# Patient Record
Sex: FEMALE | Race: WHITE | NOT HISPANIC OR LATINO
[De-identification: names, ages, dates, MRNs, and addresses within clinical notes are randomized per-mention and may not be internally consistent; named-entity substitution may affect disease eponyms.]

---

## 2017-11-08 ENCOUNTER — NON-APPOINTMENT (OUTPATIENT)
Age: 42
End: 2017-11-08

## 2017-11-08 ENCOUNTER — APPOINTMENT (OUTPATIENT)
Dept: FAMILY MEDICINE | Facility: CLINIC | Age: 42
End: 2017-11-08
Payer: MEDICAID

## 2017-11-08 VITALS
HEART RATE: 100 BPM | WEIGHT: 129 LBS | DIASTOLIC BLOOD PRESSURE: 80 MMHG | SYSTOLIC BLOOD PRESSURE: 122 MMHG | OXYGEN SATURATION: 97 % | HEIGHT: 69 IN | BODY MASS INDEX: 19.11 KG/M2

## 2017-11-08 DIAGNOSIS — Z13.220 ENCOUNTER FOR SCREENING FOR LIPOID DISORDERS: ICD-10-CM

## 2017-11-08 DIAGNOSIS — Z85.3 PERSONAL HISTORY OF MALIGNANT NEOPLASM OF BREAST: ICD-10-CM

## 2017-11-08 DIAGNOSIS — Z13.0 ENCOUNTER FOR SCREENING FOR DISEASES OF THE BLOOD AND BLOOD-FORMING ORGANS AND CERTAIN DISORDERS INVOLVING THE IMMUNE MECHANISM: ICD-10-CM

## 2017-11-08 DIAGNOSIS — Z23 ENCOUNTER FOR IMMUNIZATION: ICD-10-CM

## 2017-11-08 DIAGNOSIS — Z13.0 ENCOUNTER FOR SCREENING FOR OTHER SUSPECTED ENDOCRINE DISORDER: ICD-10-CM

## 2017-11-08 DIAGNOSIS — Z13.228 ENCOUNTER FOR SCREENING FOR OTHER SUSPECTED ENDOCRINE DISORDER: ICD-10-CM

## 2017-11-08 DIAGNOSIS — Z13.29 ENCOUNTER FOR SCREENING FOR OTHER SUSPECTED ENDOCRINE DISORDER: ICD-10-CM

## 2017-11-08 DIAGNOSIS — F17.200 NICOTINE DEPENDENCE, UNSPECIFIED, UNCOMPLICATED: ICD-10-CM

## 2017-11-08 DIAGNOSIS — F41.9 ANXIETY DISORDER, UNSPECIFIED: ICD-10-CM

## 2017-11-08 DIAGNOSIS — Z87.898 PERSONAL HISTORY OF OTHER SPECIFIED CONDITIONS: ICD-10-CM

## 2017-11-08 DIAGNOSIS — Z85.42 PERSONAL HISTORY OF MALIGNANT NEOPLASM OF OTHER PARTS OF UTERUS: ICD-10-CM

## 2017-11-08 PROCEDURE — G0008: CPT

## 2017-11-08 PROCEDURE — 93000 ELECTROCARDIOGRAM COMPLETE: CPT

## 2017-11-08 PROCEDURE — 99204 OFFICE O/P NEW MOD 45 MIN: CPT | Mod: 25

## 2017-11-08 PROCEDURE — 90688 IIV4 VACCINE SPLT 0.5 ML IM: CPT

## 2017-11-08 PROCEDURE — 99406 BEHAV CHNG SMOKING 3-10 MIN: CPT | Mod: 25

## 2017-11-08 RX ORDER — GABAPENTIN 300 MG
300 TABLET ORAL
Refills: 0 | Status: DISCONTINUED | COMMUNITY
End: 2017-11-08

## 2017-11-08 RX ORDER — GABAPENTIN 600 MG/1
600 TABLET, COATED ORAL 4 TIMES DAILY
Refills: 0 | Status: DISCONTINUED | COMMUNITY
End: 2017-11-08

## 2017-11-08 RX ORDER — ALPRAZOLAM 0.25 MG/1
0.25 TABLET ORAL 4 TIMES DAILY
Refills: 0 | Status: DISCONTINUED | COMMUNITY
End: 2017-11-08

## 2017-11-14 ENCOUNTER — APPOINTMENT (OUTPATIENT)
Dept: MRI IMAGING | Facility: CLINIC | Age: 42
End: 2017-11-14

## 2017-11-28 ENCOUNTER — APPOINTMENT (OUTPATIENT)
Dept: FAMILY MEDICINE | Facility: CLINIC | Age: 42
End: 2017-11-28

## 2017-12-05 ENCOUNTER — APPOINTMENT (OUTPATIENT)
Dept: FAMILY MEDICINE | Facility: CLINIC | Age: 42
End: 2017-12-05

## 2017-12-08 ENCOUNTER — RX RENEWAL (OUTPATIENT)
Age: 42
End: 2017-12-08

## 2018-01-16 ENCOUNTER — APPOINTMENT (OUTPATIENT)
Dept: FAMILY MEDICINE | Facility: CLINIC | Age: 43
End: 2018-01-16

## 2018-03-30 ENCOUNTER — RX RENEWAL (OUTPATIENT)
Age: 43
End: 2018-03-30

## 2018-04-02 ENCOUNTER — APPOINTMENT (OUTPATIENT)
Dept: FAMILY MEDICINE | Facility: CLINIC | Age: 43
End: 2018-04-02

## 2018-04-30 ENCOUNTER — RX RENEWAL (OUTPATIENT)
Age: 43
End: 2018-04-30

## 2018-09-03 PROBLEM — Z85.42 HISTORY OF CANCER OF UTERUS: Status: RESOLVED | Noted: 2017-11-08 | Resolved: 2018-09-03

## 2018-09-06 ENCOUNTER — RX RENEWAL (OUTPATIENT)
Age: 43
End: 2018-09-06

## 2018-09-21 ENCOUNTER — RX RENEWAL (OUTPATIENT)
Age: 43
End: 2018-09-21

## 2018-09-24 ENCOUNTER — APPOINTMENT (OUTPATIENT)
Dept: FAMILY MEDICINE | Facility: CLINIC | Age: 43
End: 2018-09-24
Payer: COMMERCIAL

## 2018-09-24 VITALS
HEART RATE: 112 BPM | BODY MASS INDEX: 17.77 KG/M2 | TEMPERATURE: 98.4 F | HEIGHT: 69 IN | SYSTOLIC BLOOD PRESSURE: 98 MMHG | DIASTOLIC BLOOD PRESSURE: 58 MMHG | WEIGHT: 120 LBS | OXYGEN SATURATION: 98 %

## 2018-09-24 DIAGNOSIS — L02.512 CUTANEOUS ABSCESS OF LEFT HAND: ICD-10-CM

## 2018-09-24 DIAGNOSIS — M54.5 LOW BACK PAIN: ICD-10-CM

## 2018-09-24 PROCEDURE — 99203 OFFICE O/P NEW LOW 30 MIN: CPT

## 2018-09-25 PROBLEM — L02.512 ABSCESS OF HAND, LEFT: Status: RESOLVED | Noted: 2017-11-08 | Resolved: 2018-09-25

## 2018-09-25 NOTE — REVIEW OF SYSTEMS
[Back Pain] : back pain [Headache] : headache [Anxiety] : anxiety [Negative] : Heme/Lymph [FreeTextEntry9] : as per HPI [de-identified] : as per HPI

## 2018-09-25 NOTE — ASSESSMENT
[FreeTextEntry1] : Left foot fracture/Right wrist fracture/Multiple rib fractures/Acute low back pain:\par c/w Gabapentin for pain control\par advised to f/u with orthopedics\par \par Subarachnoid hemorrhage:\par see neurology\par \par sign release to obtain hospital records

## 2018-09-25 NOTE — PHYSICAL EXAM
[No Acute Distress] : no acute distress [Well Nourished] : well nourished [Normal Sclera/Conjunctiva] : normal sclera/conjunctiva [PERRL] : pupils equal round and reactive to light [EOMI] : extraocular movements intact [Normal Oropharynx] : the oropharynx was normal [Normal TMs] : both tympanic membranes were normal [Normal Appearance] : was normal in appearance [Neck Supple] : was supple [No Respiratory Distress] : no respiratory distress  [Clear to Auscultation] : lungs were clear to auscultation bilaterally [Normal Rate] : normal rate  [Regular Rhythm] : with a regular rhythm [Normal S1, S2] : normal S1 and S2 [Soft] : abdomen soft [Non Tender] : non-tender [Normal Posterior Cervical Nodes] : no posterior cervical lymphadenopathy [Normal Anterior Cervical Nodes] : no anterior cervical lymphadenopathy [No Focal Deficits] : no focal deficits [Alert and Oriented x3] : oriented to person, place, and time [Well Developed] : well developed [No Rash] : no rash [de-identified] : tenderness mid to low back [de-identified] : cast right forearm, cast left lower leg/foot; tenderness right anterior ribs [de-identified] : Ambulating with crutches
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
## 2018-10-18 ENCOUNTER — RX RENEWAL (OUTPATIENT)
Age: 43
End: 2018-10-18

## 2018-11-18 ENCOUNTER — RX RENEWAL (OUTPATIENT)
Age: 43
End: 2018-11-18

## 2019-02-25 ENCOUNTER — APPOINTMENT (OUTPATIENT)
Dept: ORTHOPEDIC SURGERY | Facility: CLINIC | Age: 44
End: 2019-02-25
Payer: COMMERCIAL

## 2019-02-25 VITALS — BODY MASS INDEX: 16.46 KG/M2 | WEIGHT: 115 LBS | HEIGHT: 70 IN

## 2019-02-25 DIAGNOSIS — S52.509P: ICD-10-CM

## 2019-02-25 PROCEDURE — 99202 OFFICE O/P NEW SF 15 MIN: CPT

## 2019-02-26 PROBLEM — S52.509P: Status: ACTIVE | Noted: 2019-02-26

## 2019-02-26 NOTE — ADDENDUM
[FreeTextEntry1] : I, Jemima Becerra wrote this note acting as a scribe for Dr. Kole Hernandez on Feb 25, 2019.

## 2019-02-26 NOTE — PHYSICAL EXAM
[de-identified] : Patient is WDWN, alert, and in no acute distress. Breathing is unlabored. She is grossly oriented to person, place, and time. \par \par Right Wrist: Radial deformity is present. There is tenderness and swelling.  The ROM is limited. There is no joint instability on provocative testing. Decreased sensation in the long and ring fingers. \par Strength: extension, flexion, ulnar deviation and radial deviation 5/5.  [de-identified] : Xrays of the right wrist obtained from an outside medical facility revealed malunion of the distal radius.
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
## 2019-02-26 NOTE — DISCUSSION/SUMMARY
[de-identified] : The underlying pathophysiology was reviewed with the patient. Treatment options were discussed including surgical intervention. The patient wishes to proceed with corrective osteotomy at this time. The risks and benefits were reviewed with the patient. All of her questions were answered. She will meet with our surgical scheduler.

## 2019-03-01 ENCOUNTER — OUTPATIENT (OUTPATIENT)
Dept: OUTPATIENT SERVICES | Facility: HOSPITAL | Age: 44
LOS: 1 days | End: 2019-03-01
Payer: MEDICAID

## 2019-03-01 PROCEDURE — G9001: CPT

## 2019-03-08 DIAGNOSIS — Z71.89 OTHER SPECIFIED COUNSELING: ICD-10-CM

## 2019-03-11 ENCOUNTER — APPOINTMENT (OUTPATIENT)
Dept: FAMILY MEDICINE | Facility: CLINIC | Age: 44
End: 2019-03-11

## 2019-03-11 ENCOUNTER — OUTPATIENT (OUTPATIENT)
Dept: OUTPATIENT SERVICES | Facility: HOSPITAL | Age: 44
LOS: 1 days | End: 2019-03-11
Payer: COMMERCIAL

## 2019-03-11 VITALS
TEMPERATURE: 98 F | DIASTOLIC BLOOD PRESSURE: 58 MMHG | SYSTOLIC BLOOD PRESSURE: 99 MMHG | HEART RATE: 92 BPM | WEIGHT: 121.92 LBS | OXYGEN SATURATION: 99 % | HEIGHT: 68 IN | RESPIRATION RATE: 14 BRPM

## 2019-03-11 DIAGNOSIS — Z98.890 OTHER SPECIFIED POSTPROCEDURAL STATES: Chronic | ICD-10-CM

## 2019-03-11 DIAGNOSIS — Z90.13 ACQUIRED ABSENCE OF BILATERAL BREASTS AND NIPPLES: Chronic | ICD-10-CM

## 2019-03-11 DIAGNOSIS — S52.90XA UNSPECIFIED FRACTURE OF UNSPECIFIED FOREARM, INITIAL ENCOUNTER FOR CLOSED FRACTURE: ICD-10-CM

## 2019-03-11 DIAGNOSIS — Z01.818 ENCOUNTER FOR OTHER PREPROCEDURAL EXAMINATION: ICD-10-CM

## 2019-03-11 DIAGNOSIS — Z96.7 PRESENCE OF OTHER BONE AND TENDON IMPLANTS: Chronic | ICD-10-CM

## 2019-03-11 DIAGNOSIS — G56.01 CARPAL TUNNEL SYNDROME, RIGHT UPPER LIMB: ICD-10-CM

## 2019-03-11 DIAGNOSIS — Z90.710 ACQUIRED ABSENCE OF BOTH CERVIX AND UTERUS: Chronic | ICD-10-CM

## 2019-03-11 DIAGNOSIS — S62.101A FRACTURE OF UNSPECIFIED CARPAL BONE, RIGHT WRIST, INITIAL ENCOUNTER FOR CLOSED FRACTURE: ICD-10-CM

## 2019-03-11 LAB
HCT VFR BLD CALC: 42.3 % — SIGNIFICANT CHANGE UP (ref 34.5–45)
HGB BLD-MCNC: 14.2 G/DL — SIGNIFICANT CHANGE UP (ref 11.5–15.5)
MCHC RBC-ENTMCNC: 30.8 PG — SIGNIFICANT CHANGE UP (ref 27–34)
MCHC RBC-ENTMCNC: 33.6 GM/DL — SIGNIFICANT CHANGE UP (ref 32–36)
MCV RBC AUTO: 91.8 FL — SIGNIFICANT CHANGE UP (ref 80–100)
NRBC # BLD: 0 /100 WBCS — SIGNIFICANT CHANGE UP (ref 0–0)
PLATELET # BLD AUTO: 150 K/UL — SIGNIFICANT CHANGE UP (ref 150–400)
RBC # BLD: 4.61 M/UL — SIGNIFICANT CHANGE UP (ref 3.8–5.2)
RBC # FLD: 14.2 % — SIGNIFICANT CHANGE UP (ref 10.3–14.5)
WBC # BLD: 5.26 K/UL — SIGNIFICANT CHANGE UP (ref 3.8–10.5)
WBC # FLD AUTO: 5.26 K/UL — SIGNIFICANT CHANGE UP (ref 3.8–10.5)

## 2019-03-11 PROCEDURE — G0463: CPT

## 2019-03-11 PROCEDURE — 85027 COMPLETE CBC AUTOMATED: CPT

## 2019-03-11 PROCEDURE — 36415 COLL VENOUS BLD VENIPUNCTURE: CPT

## 2019-03-11 NOTE — H&P PST ADULT - MUSCULOSKELETAL
details… detailed exam no calf tenderness/joint swelling/diminished strength/decreased ROM due to pain

## 2019-03-11 NOTE — H&P PST ADULT - NSANTHOSAYNRD_GEN_A_CORE
No. PAULIE screening performed.  STOP BANG Legend: 0-2 = LOW Risk; 3-4 = INTERMEDIATE Risk; 5-8 = HIGH Risk

## 2019-03-11 NOTE — H&P PST ADULT - PROBLEM SELECTOR PLAN 1
scheduled for corrective osteotomy right distal radius with bone graft and bone marrow aspiration and right carpal tunnel release on 3/22/18  pre op instruction   Smoking cessation ; not to smoke on  DOS

## 2019-03-11 NOTE — H&P PST ADULT - MUSCULOSKELETAL COMMENTS
Right wrist pain , swelling , carpal tunnel s/p injury Right wrist tender on palpation to dorsal aspect , + mild edema . reports numbness

## 2019-03-11 NOTE — H&P PST ADULT - PSH
S/P bilateral mastectomy  2013  S/P breast reconstruction, bilateral  2013 Gummy bear breast implant  S/P hysterectomy  2012  S/P ORIF (open reduction internal fixation) fracture  left foot

## 2019-03-11 NOTE — H&P PST ADULT - PMH
Cervical cancer  s/p hysterectomy and radiation 2012  Current smoker    Migraine Cervical cancer  s/p hysterectomy and radiation 2012  Current smoker    Migraine    Radius fracture
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
## 2019-03-21 ENCOUNTER — TRANSCRIPTION ENCOUNTER (OUTPATIENT)
Age: 44
End: 2019-03-21

## 2019-03-21 VITALS
RESPIRATION RATE: 15 BRPM | HEIGHT: 69 IN | SYSTOLIC BLOOD PRESSURE: 127 MMHG | DIASTOLIC BLOOD PRESSURE: 94 MMHG | OXYGEN SATURATION: 100 % | WEIGHT: 123.02 LBS | HEART RATE: 75 BPM | TEMPERATURE: 98 F

## 2019-03-22 ENCOUNTER — OUTPATIENT (OUTPATIENT)
Dept: OUTPATIENT SERVICES | Facility: HOSPITAL | Age: 44
LOS: 1 days | End: 2019-03-22
Payer: COMMERCIAL

## 2019-03-22 ENCOUNTER — APPOINTMENT (OUTPATIENT)
Dept: ORTHOPEDIC SURGERY | Facility: HOSPITAL | Age: 44
End: 2019-03-22

## 2019-03-22 VITALS
RESPIRATION RATE: 16 BRPM | SYSTOLIC BLOOD PRESSURE: 109 MMHG | DIASTOLIC BLOOD PRESSURE: 75 MMHG | OXYGEN SATURATION: 96 % | HEART RATE: 74 BPM

## 2019-03-22 DIAGNOSIS — S62.101A FRACTURE OF UNSPECIFIED CARPAL BONE, RIGHT WRIST, INITIAL ENCOUNTER FOR CLOSED FRACTURE: ICD-10-CM

## 2019-03-22 DIAGNOSIS — Z90.13 ACQUIRED ABSENCE OF BILATERAL BREASTS AND NIPPLES: Chronic | ICD-10-CM

## 2019-03-22 DIAGNOSIS — Z98.890 OTHER SPECIFIED POSTPROCEDURAL STATES: Chronic | ICD-10-CM

## 2019-03-22 DIAGNOSIS — Z96.7 PRESENCE OF OTHER BONE AND TENDON IMPLANTS: Chronic | ICD-10-CM

## 2019-03-22 DIAGNOSIS — G56.01 CARPAL TUNNEL SYNDROME, RIGHT UPPER LIMB: ICD-10-CM

## 2019-03-22 DIAGNOSIS — Z90.710 ACQUIRED ABSENCE OF BOTH CERVIX AND UTERUS: Chronic | ICD-10-CM

## 2019-03-22 DIAGNOSIS — Z01.818 ENCOUNTER FOR OTHER PREPROCEDURAL EXAMINATION: ICD-10-CM

## 2019-03-22 PROCEDURE — 25405 REPAIR/GRAFT RADIUS OR ULNA: CPT | Mod: RT

## 2019-03-22 PROCEDURE — C1713: CPT

## 2019-03-22 PROCEDURE — 76000 FLUOROSCOPY <1 HR PHYS/QHP: CPT

## 2019-03-22 PROCEDURE — 38220 DX BONE MARROW ASPIRATIONS: CPT

## 2019-03-22 PROCEDURE — 64721 CARPAL TUNNEL SURGERY: CPT | Mod: RT

## 2019-03-22 PROCEDURE — 25400 REPAIR RADIUS OR ULNA: CPT | Mod: RT

## 2019-03-22 PROCEDURE — C1889: CPT

## 2019-03-22 PROCEDURE — 64721 CARPAL TUNNEL SURGERY: CPT | Mod: 59,RT

## 2019-03-22 RX ORDER — IBUPROFEN 200 MG
1 TABLET ORAL
Qty: 40 | Refills: 0 | OUTPATIENT
Start: 2019-03-22

## 2019-03-22 RX ORDER — ONDANSETRON 8 MG/1
4 TABLET, FILM COATED ORAL ONCE
Qty: 0 | Refills: 0 | Status: DISCONTINUED | OUTPATIENT
Start: 2019-03-22 | End: 2019-03-22

## 2019-03-22 RX ORDER — CEFAZOLIN SODIUM 1 G
2000 VIAL (EA) INJECTION ONCE
Qty: 0 | Refills: 0 | Status: COMPLETED | OUTPATIENT
Start: 2019-03-22 | End: 2019-03-22

## 2019-03-22 RX ORDER — OXYCODONE HYDROCHLORIDE 5 MG/1
10 TABLET ORAL ONCE
Qty: 0 | Refills: 0 | Status: DISCONTINUED | OUTPATIENT
Start: 2019-03-22 | End: 2019-03-22

## 2019-03-22 RX ORDER — CHLORHEXIDINE GLUCONATE 213 G/1000ML
1 SOLUTION TOPICAL ONCE
Qty: 0 | Refills: 0 | Status: COMPLETED | OUTPATIENT
Start: 2019-03-22 | End: 2019-03-22

## 2019-03-22 RX ORDER — OXYCODONE HYDROCHLORIDE 5 MG/1
1 TABLET ORAL
Qty: 25 | Refills: 0 | OUTPATIENT
Start: 2019-03-22

## 2019-03-22 RX ORDER — SODIUM CHLORIDE 9 MG/ML
1000 INJECTION, SOLUTION INTRAVENOUS
Qty: 0 | Refills: 0 | Status: DISCONTINUED | OUTPATIENT
Start: 2019-03-22 | End: 2019-03-22

## 2019-03-22 RX ADMIN — CHLORHEXIDINE GLUCONATE 1 APPLICATION(S): 213 SOLUTION TOPICAL at 07:38

## 2019-03-22 NOTE — BRIEF OPERATIVE NOTE - NSICDXBRIEFPREOP_GEN_ALL_CORE_FT
PRE-OP DIAGNOSIS:  Carpal tunnel syndrome of right wrist 22-Mar-2019 11:17:28  Xavier Jenkins  Fracture of distal end of right radius 22-Mar-2019 11:16:13  Xavier Jenkins

## 2019-03-22 NOTE — ASU DISCHARGE PLAN (ADULT/PEDIATRIC) - CARE PROVIDER_API CALL
Kole Hernandez)  Orthopaedic Surgery  [address]  [place] [address]  Phone: [phone]  Fax: [phone]  Follow Up Time:

## 2019-03-22 NOTE — ASU DISCHARGE PLAN (ADULT/PEDIATRIC) - ASU DC SPECIAL INSTRUCTIONSFT
Use your posey block while lying in bed to help keep your arm elevated.   Use your sling while ambulating to help keep your arm elevated.   Elevating your arm helps prevent pain and swelling.     Please refrain from smoking, smoking can delay healing and even prevent healing of your injury.

## 2019-03-22 NOTE — BRIEF OPERATIVE NOTE - NSICDXBRIEFPROCEDURE_GEN_ALL_CORE_FT
PROCEDURES:  Open release of right carpal tunnel 22-Mar-2019 11:18:46  Xavier Jenkins  Osteotomy of distal radius of right wrist 22-Mar-2019 11:18:01  Xavier Jenkins

## 2019-03-22 NOTE — BRIEF OPERATIVE NOTE - NSICDXBRIEFPOSTOP_GEN_ALL_CORE_FT
POST-OP DIAGNOSIS:  Carpal tunnel syndrome of right wrist 22-Mar-2019 11:14:33  Xavier Jenkins  Fracture of distal end of right radius 22-Mar-2019 11:14:23  Xavier Jenkins

## 2019-03-27 PROBLEM — S52.90XA UNSPECIFIED FRACTURE OF UNSPECIFIED FOREARM, INITIAL ENCOUNTER FOR CLOSED FRACTURE: Chronic | Status: ACTIVE | Noted: 2019-03-11

## 2019-03-27 PROBLEM — F17.200 NICOTINE DEPENDENCE, UNSPECIFIED, UNCOMPLICATED: Chronic | Status: ACTIVE | Noted: 2019-03-11

## 2019-03-27 PROBLEM — G43.909 MIGRAINE, UNSPECIFIED, NOT INTRACTABLE, WITHOUT STATUS MIGRAINOSUS: Chronic | Status: ACTIVE | Noted: 2019-03-11

## 2019-03-27 PROBLEM — C53.9 MALIGNANT NEOPLASM OF CERVIX UTERI, UNSPECIFIED: Chronic | Status: ACTIVE | Noted: 2019-03-11

## 2019-04-01 ENCOUNTER — APPOINTMENT (OUTPATIENT)
Dept: ORTHOPEDIC SURGERY | Facility: CLINIC | Age: 44
End: 2019-04-01

## 2019-09-09 ENCOUNTER — APPOINTMENT (OUTPATIENT)
Dept: FAMILY MEDICINE | Facility: CLINIC | Age: 44
End: 2019-09-09

## 2019-10-14 ENCOUNTER — LABORATORY RESULT (OUTPATIENT)
Age: 44
End: 2019-10-14

## 2019-10-14 ENCOUNTER — APPOINTMENT (OUTPATIENT)
Dept: FAMILY MEDICINE | Facility: CLINIC | Age: 44
End: 2019-10-14
Payer: MEDICAID

## 2019-10-14 VITALS
OXYGEN SATURATION: 98 % | RESPIRATION RATE: 16 BRPM | SYSTOLIC BLOOD PRESSURE: 116 MMHG | TEMPERATURE: 98.5 F | HEART RATE: 84 BPM | BODY MASS INDEX: 17.33 KG/M2 | DIASTOLIC BLOOD PRESSURE: 66 MMHG | HEIGHT: 69 IN | WEIGHT: 117 LBS

## 2019-10-14 DIAGNOSIS — Z00.00 ENCOUNTER FOR GENERAL ADULT MEDICAL EXAMINATION W/OUT ABNORMAL FINDINGS: ICD-10-CM

## 2019-10-14 DIAGNOSIS — Z02.82 ENCOUNTER FOR ADOPTION SERVICES: ICD-10-CM

## 2019-10-14 DIAGNOSIS — Z87.898 PERSONAL HISTORY OF OTHER SPECIFIED CONDITIONS: ICD-10-CM

## 2019-10-14 DIAGNOSIS — F11.11 OPIOID ABUSE, IN REMISSION: ICD-10-CM

## 2019-10-14 DIAGNOSIS — S92.902A UNSPECIFIED FRACTURE OF LEFT FOOT, INITIAL ENCOUNTER FOR CLOSED FRACTURE: ICD-10-CM

## 2019-10-14 DIAGNOSIS — S06.6X9A TRAUMATIC SUBARACHNOID HEMORRHAGE WITH LOSS OF CONSCIOUSNESS OF UNSPECIFIED DURATION, INITIAL ENCOUNTER: ICD-10-CM

## 2019-10-14 DIAGNOSIS — S62.101A FRACTURE OF UNSPECIFIED CARPAL BONE, RIGHT WRIST, INITIAL ENCOUNTER FOR CLOSED FRACTURE: ICD-10-CM
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
## 2019-10-14 DIAGNOSIS — F17.200 NICOTINE DEPENDENCE, UNSPECIFIED, UNCOMPLICATED: ICD-10-CM

## 2019-10-14 DIAGNOSIS — S22.49XA MULTIPLE FRACTURES OF RIBS, UNSPECIFIED SIDE, INITIAL ENCOUNTER FOR CLOSED FRACTURE: ICD-10-CM

## 2019-10-14 PROCEDURE — 99214 OFFICE O/P EST MOD 30 MIN: CPT | Mod: 25

## 2019-10-14 PROCEDURE — G0444 DEPRESSION SCREEN ANNUAL: CPT

## 2019-10-14 RX ORDER — METHADONE HCL 100 %
POWDER (GRAM) MISCELLANEOUS
Refills: 0 | Status: ACTIVE | COMMUNITY
Start: 2019-10-14

## 2019-10-14 RX ORDER — GABAPENTIN 300 MG/1
300 CAPSULE ORAL 4 TIMES DAILY
Qty: 120 | Refills: 0 | Status: DISCONTINUED | COMMUNITY
Start: 2017-11-08 | End: 2019-10-14

## 2019-10-14 RX ORDER — GABAPENTIN 600 MG/1
600 TABLET, COATED ORAL 4 TIMES DAILY
Qty: 120 | Refills: 0 | Status: DISCONTINUED | COMMUNITY
Start: 2017-11-08 | End: 2019-10-14

## 2019-10-14 RX ORDER — DOXYCYCLINE HYCLATE 100 MG/1
100 TABLET ORAL
Qty: 14 | Refills: 0 | Status: DISCONTINUED | COMMUNITY
Start: 2017-11-08 | End: 2019-10-14

## 2019-10-14 RX ORDER — ALPRAZOLAM 0.25 MG/1
0.25 TABLET ORAL
Qty: 60 | Refills: 0 | Status: DISCONTINUED | COMMUNITY
Start: 2017-11-08 | End: 2019-10-14

## 2019-10-14 NOTE — PHYSICAL EXAM
[Well Developed] : well developed [No Acute Distress] : no acute distress [Normal TMs] : both tympanic membranes were normal [PERRL] : pupils equal round and reactive to light [Normal Sclera/Conjunctiva] : normal sclera/conjunctiva [Neck Supple] : was supple [No Respiratory Distress] : no respiratory distress  [Normal Appearance] : was normal in appearance [Clear to Auscultation] : lungs were clear to auscultation bilaterally [Normal Rate] : normal rate  [Regular Rhythm] : with a regular rhythm [Normal S1, S2] : normal S1 and S2 [No Carotid Bruits] : no carotid bruits [No Edema] : there was no peripheral edema [No Murmur] : no murmur heard [Soft] : abdomen soft [Non Tender] : non-tender [Normal Posterior Cervical Nodes] : no posterior cervical lymphadenopathy [No Focal Deficits] : no focal deficits [Alert and Oriented x3] : oriented to person, place, and time [Normal Anterior Cervical Nodes] : no anterior cervical lymphadenopathy [No Spinal Tenderness] : no spinal tenderness

## 2019-10-15 PROBLEM — S92.902A FOOT FRACTURE, LEFT: Status: RESOLVED | Noted: 2018-09-24 | Resolved: 2019-10-15

## 2019-10-15 PROBLEM — F17.200 NICOTINE DEPENDENCE: Status: ACTIVE | Noted: 2017-11-08
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
## 2019-10-15 PROBLEM — S62.101A WRIST FRACTURE, RIGHT: Status: RESOLVED | Noted: 2018-09-24 | Resolved: 2019-10-15

## 2019-10-15 PROBLEM — S06.6X9A SUBARACHNOID HEMORRHAGE, TRAUMATIC: Status: RESOLVED | Noted: 2018-09-24 | Resolved: 2019-10-15

## 2019-10-15 PROBLEM — S22.49XA MULTIPLE RIB FRACTURES: Status: RESOLVED | Noted: 2018-09-24 | Resolved: 2019-10-15

## 2019-10-15 NOTE — HISTORY OF PRESENT ILLNESS
[FreeTextEntry1] : Follow up [de-identified] : \par 44 year old female presents for follow up\par \par Remains with chronic daily headaches, localized to the top of her head\par mainly dull headaches\par sometimes has associated photophobia\par takes Gabapentin which helps\par reports purchasing Gabapentin online- has been taking 800 mg four times daily\par has not been to see a neurologist \par \par current smoker- down to 2-3 cigarettes a day ( 1 pack can last a week)\par \par using Marijuana two times a month- self medicating, to help with headaches\par \par former IV heroin user- following with a Methadone clinic (being tapered down)\par following with a \par was checked for HIV, Hepatitis C, reports results were negative\par \par reports h/o cervical/uterine cancer- had hysterectomy in 2013\par \par reports h/o breast cancer, diagnosed 2013, s/p double mastectomy with reconstruction and implants. She did have radiation, denies chemo. \par \par declines Flu vaccine

## 2019-10-15 NOTE — ASSESSMENT
[FreeTextEntry1] : Frequent headaches- renewed Gabapentin for 600 mg to take 4 times a day, advised to see neurology for further evaluation and medication refills, check blood work\par History of Heroin abuse- following with a methadone clinic, advised to see cardiology (check echo)\par Nicotine Dependence- advised on smoking cessation, patient has cut back, aware of risks\par Health care maintenance- not following with GYN (reports mastectomy, hysterectomy), declines Flu vaccine, check blood work\par Anxiety/Depression- consider evaluation by in house behavioral health counselor (2 minutes spent with patient on screening for depression)\par \par advised to see oncology given cancer h/o

## 2019-10-15 NOTE — REVIEW OF SYSTEMS
[Headache] : headache [Fever] : no fever [Chills] : no chills [Chest Pain] : no chest pain [Shortness Of Breath] : no shortness of breath [Cough] : no cough

## 2019-10-16 LAB
25(OH)D3 SERPL-MCNC: 29.3 NG/ML
ALBUMIN SERPL ELPH-MCNC: 4.9 G/DL
ALP BLD-CCNC: 83 U/L
ALT SERPL-CCNC: 7 U/L
ANION GAP SERPL CALC-SCNC: 14 MMOL/L
AST SERPL-CCNC: 15 U/L
B BURGDOR IGG+IGM SER QL IB: NORMAL
BASOPHILS # BLD AUTO: 0.03 K/UL
BASOPHILS NFR BLD AUTO: 0.4 %
BILIRUB SERPL-MCNC: 0.6 MG/DL
BUN SERPL-MCNC: 17 MG/DL
CALCIUM SERPL-MCNC: 10.2 MG/DL
CHLORIDE SERPL-SCNC: 102 MMOL/L
CHOLEST SERPL-MCNC: 192 MG/DL
CHOLEST/HDLC SERPL: 4.3 RATIO
CO2 SERPL-SCNC: 24 MMOL/L
CREAT SERPL-MCNC: 0.9 MG/DL
EOSINOPHIL # BLD AUTO: 0.08 K/UL
EOSINOPHIL NFR BLD AUTO: 1.1 %
ESTIMATED AVERAGE GLUCOSE: 97 MG/DL
FOLATE SERPL-MCNC: >20 NG/ML
GLUCOSE SERPL-MCNC: 94 MG/DL
HBA1C MFR BLD HPLC: 5 %
HCT VFR BLD CALC: 42 %
HDLC SERPL-MCNC: 45 MG/DL
HGB BLD-MCNC: 13.5 G/DL
IMM GRANULOCYTES NFR BLD AUTO: 0.3 %
LDLC SERPL CALC-MCNC: 115 MG/DL
LYMPHOCYTES # BLD AUTO: 1.7 K/UL
LYMPHOCYTES NFR BLD AUTO: 24 %
MAN DIFF?: NORMAL
MCHC RBC-ENTMCNC: 31.1 PG
MCHC RBC-ENTMCNC: 32.1 GM/DL
MCV RBC AUTO: 96.8 FL
MONOCYTES # BLD AUTO: 0.23 K/UL
MONOCYTES NFR BLD AUTO: 3.2 %
NEUTROPHILS # BLD AUTO: 5.03 K/UL
NEUTROPHILS NFR BLD AUTO: 71 %
PLATELET # BLD AUTO: 195 K/UL
POTASSIUM SERPL-SCNC: 4.4 MMOL/L
PROT SERPL-MCNC: 8.8 G/DL
RBC # BLD: 4.34 M/UL
RBC # FLD: 12.9 %
SODIUM SERPL-SCNC: 140 MMOL/L
T4 FREE SERPL-MCNC: 1.2 NG/DL
TRIGL SERPL-MCNC: 160 MG/DL
TSH SERPL-ACNC: 0.41 UIU/ML
URATE SERPL-MCNC: 3.8 MG/DL
VIT B12 SERPL-MCNC: 509 PG/ML
WBC # FLD AUTO: 7.09 K/UL

## 2019-10-23 ENCOUNTER — APPOINTMENT (OUTPATIENT)
Dept: FAMILY MEDICINE | Facility: CLINIC | Age: 44
End: 2019-10-23

## 2019-12-04 ENCOUNTER — APPOINTMENT (OUTPATIENT)
Dept: FAMILY MEDICINE | Facility: CLINIC | Age: 44
End: 2019-12-04

## 2019-12-05 ENCOUNTER — RX RENEWAL (OUTPATIENT)
Age: 44
End: 2019-12-05

## 2019-12-06 ENCOUNTER — RX RENEWAL (OUTPATIENT)
Age: 44
End: 2019-12-06

## 2020-01-17 ENCOUNTER — APPOINTMENT (OUTPATIENT)
Dept: FAMILY MEDICINE | Facility: CLINIC | Age: 45
End: 2020-01-17
Payer: MEDICAID

## 2020-01-17 VITALS
HEART RATE: 88 BPM | BODY MASS INDEX: 16.44 KG/M2 | HEIGHT: 69 IN | DIASTOLIC BLOOD PRESSURE: 76 MMHG | SYSTOLIC BLOOD PRESSURE: 124 MMHG | OXYGEN SATURATION: 98 % | RESPIRATION RATE: 16 BRPM | WEIGHT: 111 LBS

## 2020-01-17 VITALS — SYSTOLIC BLOOD PRESSURE: 110 MMHG | DIASTOLIC BLOOD PRESSURE: 62 MMHG

## 2020-01-17 DIAGNOSIS — Z11.1 ENCOUNTER FOR SCREENING FOR RESPIRATORY TUBERCULOSIS: ICD-10-CM

## 2020-01-17 DIAGNOSIS — R51 HEADACHE: ICD-10-CM

## 2020-01-17 DIAGNOSIS — F32.9 ANXIETY DISORDER, UNSPECIFIED: ICD-10-CM

## 2020-01-17 DIAGNOSIS — R63.6 UNDERWEIGHT: ICD-10-CM

## 2020-01-17 DIAGNOSIS — R63.4 ABNORMAL WEIGHT LOSS: ICD-10-CM

## 2020-01-17 DIAGNOSIS — Z01.84 ENCOUNTER FOR ANTIBODY RESPONSE EXAMINATION: ICD-10-CM

## 2020-01-17 DIAGNOSIS — F41.9 ANXIETY DISORDER, UNSPECIFIED: ICD-10-CM

## 2020-01-17 PROCEDURE — 99214 OFFICE O/P EST MOD 30 MIN: CPT

## 2020-01-17 RX ORDER — GABAPENTIN 600 MG/1
600 TABLET, COATED ORAL 4 TIMES DAILY
Qty: 120 | Refills: 0 | Status: ACTIVE | COMMUNITY
Start: 2019-10-14 | End: 1900-01-01

## 2020-01-17 RX ORDER — GABAPENTIN 800 MG/1
800 TABLET, COATED ORAL
Qty: 84 | Refills: 0 | Status: DISCONTINUED | COMMUNITY
Start: 2019-10-14 | End: 2020-01-17

## 2020-01-17 NOTE — PHYSICAL EXAM
[No Acute Distress] : no acute distress [Well Developed] : well developed [PERRL] : pupils equal round and reactive to light [Normal Sclera/Conjunctiva] : normal sclera/conjunctiva [Neck Supple] : was supple [Normal TMs] : both tympanic membranes were normal [Normal Appearance] : was normal in appearance [No Respiratory Distress] : no respiratory distress  [Normal Rate] : normal rate  [Clear to Auscultation] : lungs were clear to auscultation bilaterally [Regular Rhythm] : with a regular rhythm [No Edema] : there was no peripheral edema [No Carotid Bruits] : no carotid bruits [Normal S1, S2] : normal S1 and S2 [No Murmur] : no murmur heard [Normal Bowel Sounds] : normal bowel sounds [Non Tender] : non-tender [Soft] : abdomen soft [Normal Posterior Cervical Nodes] : no posterior cervical lymphadenopathy [No Rash] : no rash [Normal Anterior Cervical Nodes] : no anterior cervical lymphadenopathy [Alert and Oriented x3] : oriented to person, place, and time [de-identified] : Underweight, thin [No Focal Deficits] : no focal deficits [de-identified] : no erythema to oropharynx [de-identified] : no thyromegaly

## 2020-01-17 NOTE — HEALTH RISK ASSESSMENT
[1] : 1) Little interest or pleasure doing things for several days (1) [2] : 2) Feeling down, depressed, or hopeless for more than half of the days (2) [[id number]] : 3 [NXP3Mucic] : 7

## 2020-01-17 NOTE — REVIEW OF SYSTEMS
[Fever] : no fever [Chills] : no chills [Chest Pain] : no chest pain [Palpitations] : no palpitations

## 2020-01-17 NOTE — HISTORY OF PRESENT ILLNESS
[de-identified] : \par 44 year old female presents for follow up\par \par Has chronic headaches\par has scheduled an appointment to see neurology next month\par has taken Gabapentin with improvement- requests 1 month refill until she is seen by neurology\par \par current smoker- down to 2-3 cigarettes a day \par \par using Marijuana two times a month- self medicating, to help with headaches\par \par former IV heroin user- following with a Methadone clinic \par following with a \par \par reports h/o cervical/uterine cancer- had hysterectomy in 2013\par reports h/o breast cancer, diagnosed 2013, s/p double mastectomy with reconstruction and implants. She did have radiation, denies chemo. 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

## 2020-01-17 NOTE — ASSESSMENT
[FreeTextEntry1] : \par Frequent headaches:\par renewed Gabapentin for one month\par has appointment with neurology for next month- advised to keep appointment\par \par Anxiety/Depression:\par advised to see in house behavioral health counselor\par \par Underweight/Weight loss:\par advised to eat regular meals\par advised to see GI \par \par ordered blood work, urine drug screening for form\par patient left office without completing blood work or urine drug screening\par informed medical assistant in office that the drug screen "was going to light up like a [employer]"\par \par called, left a message for patient stating I would not be able to complete her form as she did not do requested testing

## 2020-02-10 ENCOUNTER — RX RENEWAL (OUTPATIENT)
Age: 45
End: 2020-02-10

## 2020-03-03 ENCOUNTER — TRANSCRIPTION ENCOUNTER (OUTPATIENT)
Age: 45
End: 2020-03-03

## 2024-04-15 DIAGNOSIS — Z12.11 ENCOUNTER FOR SCREENING FOR MALIGNANT NEOPLASM OF COLON: ICD-10-CM
